# Patient Record
Sex: MALE | Race: WHITE
[De-identification: names, ages, dates, MRNs, and addresses within clinical notes are randomized per-mention and may not be internally consistent; named-entity substitution may affect disease eponyms.]

---

## 2015-03-10 VITALS
DIASTOLIC BLOOD PRESSURE: 79 MMHG | SYSTOLIC BLOOD PRESSURE: 106 MMHG | DIASTOLIC BLOOD PRESSURE: 79 MMHG | SYSTOLIC BLOOD PRESSURE: 106 MMHG | SYSTOLIC BLOOD PRESSURE: 106 MMHG | DIASTOLIC BLOOD PRESSURE: 79 MMHG

## 2017-07-26 ENCOUNTER — HOSPITAL ENCOUNTER (OUTPATIENT)
Dept: HOSPITAL 61 - PCVCIMAG | Age: 52
Discharge: HOME | End: 2017-07-26
Attending: INTERNAL MEDICINE
Payer: OTHER GOVERNMENT

## 2017-07-26 DIAGNOSIS — E78.00: ICD-10-CM

## 2017-07-26 DIAGNOSIS — Z90.49: ICD-10-CM

## 2017-07-26 DIAGNOSIS — J45.909: ICD-10-CM

## 2017-07-26 DIAGNOSIS — I49.3: Primary | ICD-10-CM

## 2017-07-26 DIAGNOSIS — I25.10: ICD-10-CM

## 2017-07-26 DIAGNOSIS — Z79.82: ICD-10-CM

## 2017-07-26 PROCEDURE — G0463 HOSPITAL OUTPT CLINIC VISIT: HCPCS

## 2017-07-26 PROCEDURE — 93351 STRESS TTE COMPLETE: CPT

## 2017-07-26 PROCEDURE — 36415 COLL VENOUS BLD VENIPUNCTURE: CPT

## 2017-07-26 PROCEDURE — 93325 DOPPLER ECHO COLOR FLOW MAPG: CPT

## 2018-07-25 ENCOUNTER — HOSPITAL ENCOUNTER (OUTPATIENT)
Dept: HOSPITAL 61 - PCVCCLINIC | Age: 53
Discharge: HOME | End: 2018-07-25
Attending: INTERNAL MEDICINE
Payer: OTHER GOVERNMENT

## 2018-07-25 DIAGNOSIS — Z79.899: ICD-10-CM

## 2018-07-25 DIAGNOSIS — R94.31: ICD-10-CM

## 2018-07-25 DIAGNOSIS — E78.00: ICD-10-CM

## 2018-07-25 DIAGNOSIS — R60.9: ICD-10-CM

## 2018-07-25 DIAGNOSIS — I25.10: Primary | ICD-10-CM

## 2018-07-25 DIAGNOSIS — Z79.82: ICD-10-CM

## 2018-07-25 PROCEDURE — 93005 ELECTROCARDIOGRAM TRACING: CPT

## 2019-09-11 ENCOUNTER — HOSPITAL ENCOUNTER (OUTPATIENT)
Dept: HOSPITAL 61 - PCVCIMAG | Age: 54
Discharge: HOME | End: 2019-09-11
Attending: INTERNAL MEDICINE
Payer: OTHER GOVERNMENT

## 2019-09-11 DIAGNOSIS — J45.909: ICD-10-CM

## 2019-09-11 DIAGNOSIS — Z79.82: ICD-10-CM

## 2019-09-11 DIAGNOSIS — I25.10: Primary | ICD-10-CM

## 2019-09-11 DIAGNOSIS — E78.00: ICD-10-CM

## 2019-09-11 DIAGNOSIS — I10: ICD-10-CM

## 2019-09-11 DIAGNOSIS — R60.9: ICD-10-CM

## 2019-09-11 PROCEDURE — 93005 ELECTROCARDIOGRAM TRACING: CPT

## 2019-09-11 PROCEDURE — G0463 HOSPITAL OUTPT CLINIC VISIT: HCPCS

## 2019-09-11 PROCEDURE — 93306 TTE W/DOPPLER COMPLETE: CPT

## 2019-09-11 NOTE — PCVCIMAG
--------------- APPROVED REPORT --------------





Study performed:  09/11/2019 08:40:05



EXAM: Comprehensive 2D, Doppler, and color-flow 

Echocardiogram

Patient Location: Echo lab

Room #:  2Status:  routine



BSA:         2.39

HR: 84 bpmBP:          122/80 mmHg

Rhythm: NSR



Other Information 

Study Quality: Good



Risk Factors: 

Cardiac Risk Factors:  HTN, 

Hyperlipidemia



Indications

CAD

Peripheral Edema

Hypertension/HDD



2D Dimensions

IVSd:  8.86 (7-11mm)LVOT Diam:  23.60 (18-24mm) 

LVDd:  45.21 mm

PWd:  9.18 (7-11mm)Ascending Ao:  38.20 (22-36mm)

LVDs:  29.52 (25-40mm)

Left Atrium:  37.49 (27-40mm)

Aortic Root:  27.73 mm

LV Single Plane 4CH:  55.74 %

LV Single Plane 2CH:  61.89 %

Biplane EF:  59.1 %



Volumes

Left Atrial Volume (Systole)

Single Plane 4CH:  56.13 mLSingle Plane 2CH:  62.29 mL

Biplane LA Volume:  60.00 mLLA ESV Index:  25.00 mL/m2



Aortic Valve

AoV Peak Collin.:  1.25 m/s

AO Peak Gr.:  6.24 mmHgLVOT Max PG:  3.61 mmHg

LVOT Max V:  0.95 m/s

YASMEEN Vmax: 3.32 cm2



Mitral Valve

E/A Ratio:  0.9

MV Decel. Time:  131.77 ms

MV E Max Collin.:  0.63 m/s

MV A Collin.:  0.73 m/s

IVRT:  62.28 ms



TDI

E/Lateral E':  9.00E/Medial E':  15.75

Medial E' Collin.:  0.04 m/s

Lateral E' Collin.:  0.07 m/s



Pulmonary Valve

PV Peak Collin.:  1.33 m/sPV Peak Gr.:  7.04 mmHg



Pulmonary Vein

P Vein S:    0.50 m/sP Vein A:  0.32 m/s

P Vein D:   0.30 m/sP Vein A Dur.:  90.0 msec

P Vein S/D Ratio:  1.67



Tricuspid Valve

TV Vmax:  0.52 m/s



Left Ventricle

The left ventricle is normal size. There is normal LV segmental wall 

motion. There is normal left ventricular wall thickness. Left 

ventricular systolic function is normal. The left ventricular 

ejection fraction is within the normal range. LVEF is 55-60%.



Right Ventricle

The right ventricle is normal size. The right ventricular systolic 

function is normal.



Atria

The left atrium size is normal. The right atrium size is 

normal.



Aortic Valve

Aortic valve is trileaflet. The aortic valve is normal in structure 

and function. No aortic regurgitation is present. There is no aortic 

valvular stenosis.



Mitral Valve

The mitral valve is normal in structure. There is no mitral valve 

regurgitation noted. No evidence of mitral valve stenosis.



Tricuspid Valve

The tricuspid valve is normal in structure. There is no tricuspid 

valve regurgitation noted. 



Pulmonic Valve

The pulmonary valve is normal in structure. There is no pulmonic 

valvular regurgitation.



Great Vessels

The aortic root is normal in size. The ascending aorta is normal in 

size. Aortic arch is normal in caliber. IVC is normal in size and 

collapses >50% with inspiration.



Pericardium

There is no pericardial effusion. There is no pleural 

effusion.



<Conclusion>

The left ventricle is normal size.

There is normal left ventricular wall thickness.

Left ventricular systolic function is normal.

The right ventricle is normal size.

The left atrium size is normal.

The aortic valve is normal in structure and function.

There is no mitral valve regurgitation noted.

There is no tricuspid valve regurgitation noted.

## 2020-03-03 ENCOUNTER — HOSPITAL ENCOUNTER (OUTPATIENT)
Dept: HOSPITAL 63 - SURG | Age: 55
End: 2020-03-03
Attending: INTERNAL MEDICINE
Payer: OTHER GOVERNMENT

## 2020-03-03 VITALS — DIASTOLIC BLOOD PRESSURE: 86 MMHG | SYSTOLIC BLOOD PRESSURE: 131 MMHG

## 2020-03-03 DIAGNOSIS — Z72.89: ICD-10-CM

## 2020-03-03 DIAGNOSIS — D12.3: ICD-10-CM

## 2020-03-03 DIAGNOSIS — E78.00: ICD-10-CM

## 2020-03-03 DIAGNOSIS — J45.909: ICD-10-CM

## 2020-03-03 DIAGNOSIS — Z98.890: ICD-10-CM

## 2020-03-03 DIAGNOSIS — Z12.11: Primary | ICD-10-CM

## 2020-03-03 DIAGNOSIS — I10: ICD-10-CM

## 2020-03-03 DIAGNOSIS — K57.30: ICD-10-CM

## 2020-03-03 DIAGNOSIS — Z90.49: ICD-10-CM

## 2020-03-03 PROCEDURE — 45380 COLONOSCOPY AND BIOPSY: CPT

## 2020-03-03 PROCEDURE — 88305 TISSUE EXAM BY PATHOLOGIST: CPT

## 2020-03-05 NOTE — PATHOLOGY
[facility] Accession Number: [id number]

.                                                                01

Material submitted:                                        .

colon - TRANSVERSE POLYP. Modifiers: transverse

.                                                                01

Clinical history:                                          .

None provided

.                                                                02

**********************************************************************

Diagnosis:

Colon biopsy, transverse colon polyp:

- Tubular adenoma.

.

([facility]:mml; 03/05/2020)

[facility]  03/05/2020  0929 Local

**********************************************************************

.                                                                02

Comment:

There is no high grade dysplasia or evidence of malignancy.

.

([facility]:mml; 03/05/2020)

.                                                                02

Electronically signed:                                     .

Alessandro Salgado MD, Pathologist

NPI- [id number]

.                                                                01

Gross description:                                         .

The specimen is received in formalin, labeled "Balta Hudson, transverse

polyp".  Received is a segment of pale tan soft tissue measuring 0.4 cm in

maximum dimensions.  The specimen is submitted entirely in cassette A1.

([facility]; 3/4/2020)

[facility]  03/04/2020  1801 Local

.                                                                02

Pathologist provided ICD-10:

D12.3

.                                                                02

CPT                                                        .

313273

Specimen Comment: A courtesy copy of this report has been sent to [phone]

Specimen Comment: [id number]

Specimen Comment: Report sent to  / DR MALAVE

***Performed at:  01

   [facility]

   [address], [place], [place]  [id number]

   MD Terence Hernández MD Phone:  [phone]

***Performed at:  02

   [facility]

   [address], [place]  [id number]

   MD Alessandro Salgado MD Phone:  [phone]

## 2020-06-03 ENCOUNTER — HOSPITAL ENCOUNTER (OUTPATIENT)
Dept: HOSPITAL 35 - SJCVCIMAG | Age: 55
End: 2020-06-03
Attending: INTERNAL MEDICINE
Payer: OTHER GOVERNMENT

## 2020-06-03 DIAGNOSIS — M79.89: ICD-10-CM

## 2020-06-03 DIAGNOSIS — M79.605: Primary | ICD-10-CM

## 2021-05-03 ENCOUNTER — HOSPITAL ENCOUNTER (OUTPATIENT)
Dept: HOSPITAL 35 - SJCVCIMAG | Age: 56
End: 2021-05-03
Attending: INTERNAL MEDICINE
Payer: OTHER GOVERNMENT

## 2021-05-03 DIAGNOSIS — R06.00: ICD-10-CM

## 2021-05-03 DIAGNOSIS — R53.83: ICD-10-CM

## 2021-05-03 DIAGNOSIS — E78.5: ICD-10-CM

## 2021-05-03 DIAGNOSIS — I25.10: Primary | ICD-10-CM

## 2021-05-03 DIAGNOSIS — I10: ICD-10-CM

## 2021-12-14 ENCOUNTER — HOSPITAL ENCOUNTER (OUTPATIENT)
Dept: HOSPITAL 61 - KCIC MRI | Age: 56
End: 2021-12-14
Payer: OTHER GOVERNMENT

## 2021-12-14 DIAGNOSIS — M72.8: Primary | ICD-10-CM

## 2021-12-14 DIAGNOSIS — M79.672: ICD-10-CM

## 2021-12-14 PROCEDURE — 73718 MRI LOWER EXTREMITY W/O DYE: CPT

## 2021-12-14 NOTE — [facility]
STUDY: MRI of the left foot without contrast



INDICATION: Left foot pain.



COMPARISON: None.



TECHNIQUE: Multiplanar MR imaging of the left foot performed without the use of intravenous contrast.
 



FINDINGS:



Bones: Minimal marrow edema without a fracture at the plantar calcaneus adjacent to the plantar fasci
a origin. No fracture or marrow contusion elsewhere throughout the imaged foot or at the ankle. No hi
gh-grade chondral defect at the ankle or subchondral marrow edema/cystic change.



Musculotendinous: Intact and normally located flexor and peroneal tendons. No large volume tendon she
ath fluid. What is seen of the Achilles is within normal limits. Unremarkable extensors. Minimal flex
or digitorum brevis muscular edema proximally. Intrinsic foot muscular bulk is maintained.



Ligaments: Intact medial and lateral ankle ligaments. Unremarkable spring and Lisfranc ligaments.



Sinus Tarsi: Maintained fatty signal.



Plantar fascia: Thickened, heterogeneous and disorganized plantar fascia central band at its origin. 
No fluid signal tear defect seen at the attachment on the axial T2 sequence but there is intermediate
 T1 signal at this location, image 16 series 4. Artifact extends through the attachment on the sagitt
al STIR sequence. 



Miscellaneous: No large joint effusion or notable ganglion cyst.



IMPRESSION:



Thickened, heterogeneous and disorganized plantar fascia central band adjacent to the calcaneal attac
hment. No discrete fluid signal tear defect but the extent of T1 signal loss at the origin is suspici
ous for at least partial disruption on a background of chronic plantar fasciitis. The suspected tear 
may be chronic given the absence of significant surrounding soft tissue or marrow edema.



Electronically signed by: YAMILETH HENAO MD (12/14/2021 11:28 AM) [id number]